# Patient Record
Sex: FEMALE | URBAN - METROPOLITAN AREA
[De-identification: names, ages, dates, MRNs, and addresses within clinical notes are randomized per-mention and may not be internally consistent; named-entity substitution may affect disease eponyms.]

---

## 2023-08-02 ENCOUNTER — OFFICE VISIT (OUTPATIENT)
Dept: PRIMARY CARE CLINIC | Age: 33
End: 2023-08-02
Payer: COMMERCIAL

## 2023-08-02 VITALS
BODY MASS INDEX: 36.84 KG/M2 | DIASTOLIC BLOOD PRESSURE: 82 MMHG | TEMPERATURE: 97.9 F | HEIGHT: 64 IN | SYSTOLIC BLOOD PRESSURE: 120 MMHG | WEIGHT: 215.8 LBS

## 2023-08-02 DIAGNOSIS — Z31.69 PRE-CONCEPTION COUNSELING: ICD-10-CM

## 2023-08-02 DIAGNOSIS — R53.83 OTHER FATIGUE: Primary | ICD-10-CM

## 2023-08-02 DIAGNOSIS — Z86.2 HX OF IRON DEFICIENCY ANEMIA: ICD-10-CM

## 2023-08-02 DIAGNOSIS — R74.8 ELEVATED ALKALINE PHOSPHATASE LEVEL: ICD-10-CM

## 2023-08-02 PROCEDURE — 99204 OFFICE O/P NEW MOD 45 MIN: CPT | Performed by: FAMILY MEDICINE

## 2023-08-02 SDOH — ECONOMIC STABILITY: FOOD INSECURITY: WITHIN THE PAST 12 MONTHS, YOU WORRIED THAT YOUR FOOD WOULD RUN OUT BEFORE YOU GOT MONEY TO BUY MORE.: NEVER TRUE

## 2023-08-02 SDOH — ECONOMIC STABILITY: FOOD INSECURITY: WITHIN THE PAST 12 MONTHS, THE FOOD YOU BOUGHT JUST DIDN'T LAST AND YOU DIDN'T HAVE MONEY TO GET MORE.: NEVER TRUE

## 2023-08-02 SDOH — ECONOMIC STABILITY: INCOME INSECURITY: HOW HARD IS IT FOR YOU TO PAY FOR THE VERY BASICS LIKE FOOD, HOUSING, MEDICAL CARE, AND HEATING?: PATIENT DECLINED

## 2023-08-02 SDOH — ECONOMIC STABILITY: HOUSING INSECURITY
IN THE LAST 12 MONTHS, WAS THERE A TIME WHEN YOU DID NOT HAVE A STEADY PLACE TO SLEEP OR SLEPT IN A SHELTER (INCLUDING NOW)?: NO

## 2023-08-02 ASSESSMENT — PATIENT HEALTH QUESTIONNAIRE - PHQ9
SUM OF ALL RESPONSES TO PHQ QUESTIONS 1-9: 0
2. FEELING DOWN, DEPRESSED OR HOPELESS: 0
1. LITTLE INTEREST OR PLEASURE IN DOING THINGS: 0
SUM OF ALL RESPONSES TO PHQ9 QUESTIONS 1 & 2: 0
SUM OF ALL RESPONSES TO PHQ QUESTIONS 1-9: 0

## 2023-08-02 ASSESSMENT — ENCOUNTER SYMPTOMS
NAUSEA: 0
COUGH: 0
VOMITING: 0
SHORTNESS OF BREATH: 0
CONSTIPATION: 0
DIARRHEA: 0

## 2023-08-18 DIAGNOSIS — Z86.2 HX OF IRON DEFICIENCY ANEMIA: ICD-10-CM

## 2023-08-18 DIAGNOSIS — R74.8 ELEVATED ALKALINE PHOSPHATASE LEVEL: ICD-10-CM

## 2023-08-18 DIAGNOSIS — R53.83 OTHER FATIGUE: ICD-10-CM

## 2023-08-18 LAB
25(OH)D3 SERPL-MCNC: 35.5 NG/ML
ALBUMIN SERPL-MCNC: 4.4 G/DL (ref 3.4–5)
ALBUMIN/GLOB SERPL: 1.9 {RATIO} (ref 1.1–2.2)
ALP SERPL-CCNC: 83 U/L (ref 40–129)
ALT SERPL-CCNC: 19 U/L (ref 10–40)
ANION GAP SERPL CALCULATED.3IONS-SCNC: 9 MMOL/L (ref 3–16)
AST SERPL-CCNC: 16 U/L (ref 15–37)
BASOPHILS # BLD: 0 K/UL (ref 0–0.2)
BASOPHILS NFR BLD: 0.7 %
BILIRUB DIRECT SERPL-MCNC: <0.2 MG/DL (ref 0–0.3)
BILIRUB INDIRECT SERPL-MCNC: NORMAL MG/DL (ref 0–1)
BILIRUB SERPL-MCNC: <0.2 MG/DL (ref 0–1)
BUN SERPL-MCNC: 12 MG/DL (ref 7–20)
CALCIUM SERPL-MCNC: 9.3 MG/DL (ref 8.3–10.6)
CHLORIDE SERPL-SCNC: 103 MMOL/L (ref 99–110)
CHOLEST SERPL-MCNC: 206 MG/DL (ref 0–199)
CO2 SERPL-SCNC: 29 MMOL/L (ref 21–32)
CREAT SERPL-MCNC: 0.8 MG/DL (ref 0.6–1.1)
DEPRECATED RDW RBC AUTO: 12.9 % (ref 12.4–15.4)
EOSINOPHIL # BLD: 0.1 K/UL (ref 0–0.6)
EOSINOPHIL NFR BLD: 1 %
FOLATE SERPL-MCNC: 15.54 NG/ML (ref 4.78–24.2)
GFR SERPLBLD CREATININE-BSD FMLA CKD-EPI: >60 ML/MIN/{1.73_M2}
GGT SERPL-CCNC: 16 U/L (ref 5–36)
GLUCOSE SERPL-MCNC: 107 MG/DL (ref 70–99)
HCT VFR BLD AUTO: 39.1 % (ref 36–48)
HDLC SERPL-MCNC: 42 MG/DL (ref 40–60)
HGB BLD-MCNC: 13.4 G/DL (ref 12–16)
LDLC SERPL CALC-MCNC: 135 MG/DL
LYMPHOCYTES # BLD: 2.2 K/UL (ref 1–5.1)
LYMPHOCYTES NFR BLD: 43.3 %
MCH RBC QN AUTO: 29.9 PG (ref 26–34)
MCHC RBC AUTO-ENTMCNC: 34.3 G/DL (ref 31–36)
MCV RBC AUTO: 87.2 FL (ref 80–100)
MONOCYTES # BLD: 0.4 K/UL (ref 0–1.3)
MONOCYTES NFR BLD: 7.5 %
NEUTROPHILS # BLD: 2.4 K/UL (ref 1.7–7.7)
NEUTROPHILS NFR BLD: 47.5 %
PLATELET # BLD AUTO: 261 K/UL (ref 135–450)
PMV BLD AUTO: 9 FL (ref 5–10.5)
POTASSIUM SERPL-SCNC: 4.3 MMOL/L (ref 3.5–5.1)
PROT SERPL-MCNC: 6.7 G/DL (ref 6.4–8.2)
RBC # BLD AUTO: 4.48 M/UL (ref 4–5.2)
SODIUM SERPL-SCNC: 141 MMOL/L (ref 136–145)
TRIGL SERPL-MCNC: 144 MG/DL (ref 0–150)
TSH SERPL DL<=0.005 MIU/L-ACNC: 0.92 UIU/ML (ref 0.27–4.2)
VIT B12 SERPL-MCNC: 777 PG/ML (ref 211–911)
VLDLC SERPL CALC-MCNC: 29 MG/DL
WBC # BLD AUTO: 5.1 K/UL (ref 4–11)

## 2023-08-19 LAB
EST. AVERAGE GLUCOSE BLD GHB EST-MCNC: 93.9 MG/DL
HBA1C MFR BLD: 4.9 %

## 2023-09-08 ENCOUNTER — OFFICE VISIT (OUTPATIENT)
Dept: PRIMARY CARE CLINIC | Age: 33
End: 2023-09-08
Payer: COMMERCIAL

## 2023-09-08 DIAGNOSIS — J35.3 ENLARGED TONSILS AND ADENOIDS: Chronic | ICD-10-CM

## 2023-09-08 DIAGNOSIS — J02.0 STREP PHARYNGITIS: Primary | ICD-10-CM

## 2023-09-08 DIAGNOSIS — R06.83 LOUD SNORING: Chronic | ICD-10-CM

## 2023-09-08 PROCEDURE — 99214 OFFICE O/P EST MOD 30 MIN: CPT | Performed by: FAMILY MEDICINE

## 2023-09-08 RX ORDER — AMOXICILLIN 500 MG/1
500 CAPSULE ORAL 2 TIMES DAILY
Qty: 20 CAPSULE | Refills: 0 | Status: SHIPPED | OUTPATIENT
Start: 2023-09-08 | End: 2023-09-18

## 2023-09-08 ASSESSMENT — ENCOUNTER SYMPTOMS
VOMITING: 0
CONSTIPATION: 0
NAUSEA: 0
DIARRHEA: 0
SHORTNESS OF BREATH: 0
COUGH: 0

## 2023-09-08 NOTE — PROGRESS NOTES
Libby Galvan (:  1990) is a 34 y.o. female,Established patient, here for evaluation of the following chief complaint(s):  Pharyngitis      SUBJECTIVE:  9.8.23 -- symptoms started yesterday with sore throat on left side and swollen tonsil on left side. Did have chills  Had tonsillitis and strep in high school many years ago. No other sick contacts that she knows about.   + strep swab in office. Recently had COVID in 2023  Prev in [place], recently moved this weekend  Child is in     Not on any medications currently   Family hx of DM in her maternal aunt. Does notice that when she gets very hungry, she can get very weak and fatigued and it is happening more often. Has been occurring overall for a few years but worsening recently. Is wanting to try to conceive again in the next 6 months, will need referral to OB GYN   -- did have gestational hypertension  - had to be induced at 37 weeks due to lack of growth but otherwise pregnancy was fine. Unsure of cause of the arrest of growth    Pharyngitis  Pertinent negatives include no chills, coughing, fever, nausea, rash or vomiting. I have reviewed the chart notes available from myself and other providers. I have reviewed and addressed all active problems and created or updated the problems list in detail, as needed    I have extensively reviewed and reconciled the medication list, discontinued medications not taking or no longer appropriate, and updated the active meds list    OBJECTIVE:  Review of Systems   Constitutional:  Negative for chills and fever. Respiratory:  Negative for cough and shortness of breath. Cardiovascular:  Negative for leg swelling. Gastrointestinal:  Negative for constipation, diarrhea, nausea and vomiting. Endocrine: Negative for polyuria. Genitourinary:  Negative for frequency. Skin:  Negative for rash.        Vitals:    23 1245 23 1303   BP: (!) 142/88 132/80

## 2023-09-10 ENCOUNTER — PATIENT MESSAGE (OUTPATIENT)
Dept: PRIMARY CARE CLINIC | Age: 33
End: 2023-09-10

## 2023-09-11 VITALS
SYSTOLIC BLOOD PRESSURE: 132 MMHG | BODY MASS INDEX: 36.32 KG/M2 | TEMPERATURE: 98.1 F | HEART RATE: 117 BPM | DIASTOLIC BLOOD PRESSURE: 80 MMHG | WEIGHT: 211.6 LBS

## 2023-09-11 PROBLEM — J35.3 ENLARGED TONSILS AND ADENOIDS: Chronic | Status: ACTIVE | Noted: 2023-09-11

## 2023-09-11 PROBLEM — R06.83 LOUD SNORING: Chronic | Status: ACTIVE | Noted: 2023-09-11

## 2023-09-18 ENCOUNTER — OFFICE VISIT (OUTPATIENT)
Dept: ENT CLINIC | Age: 33
End: 2023-09-18
Payer: COMMERCIAL

## 2023-09-18 VITALS
HEART RATE: 134 BPM | BODY MASS INDEX: 35.85 KG/M2 | HEIGHT: 64 IN | WEIGHT: 210 LBS | OXYGEN SATURATION: 94 % | DIASTOLIC BLOOD PRESSURE: 91 MMHG | SYSTOLIC BLOOD PRESSURE: 142 MMHG

## 2023-09-18 DIAGNOSIS — G47.30 SLEEP DISORDER BREATHING: Primary | ICD-10-CM

## 2023-09-18 DIAGNOSIS — J35.1 TONSILLAR HYPERTROPHY: ICD-10-CM

## 2023-09-18 PROCEDURE — 99203 OFFICE O/P NEW LOW 30 MIN: CPT | Performed by: OTOLARYNGOLOGY

## 2023-09-18 NOTE — PROGRESS NOTES
adenotonsillectomy. If she does have sleep apnea certainly an adenotonsillectomy would likely improve her sleep apnea, but not cure her. The first-line treatment would be a CPAP machine. My recommendation would be to obtain a sleep study. Patient agrees. - Oriana Tracy MD, Sleep Medicine, [facility]    2. Tonsillar hypertrophy  Size alone usually is not an indication for tonsillectomy. She is only had 1 episode of strep throat so she would not meet criteria for tonsillectomy based on this. If she starts getting more frequent strep throat we could consider taking out her tonsils and adenoids. I have performed a head and neck physical exam personally or was physically present during the key or critical portions of the service. This note was generated completely or in part utilizing Dragon dictation speech recognition software. Occasionally, words are mistranscribed and despite editing, the text may contain inaccuracies due to incorrect word recognition. If further clarification is needed please contact the office at [phone].

## 2023-10-02 ENCOUNTER — OFFICE VISIT (OUTPATIENT)
Dept: PRIMARY CARE CLINIC | Age: 33
End: 2023-10-02
Payer: COMMERCIAL

## 2023-10-02 VITALS
WEIGHT: 209.8 LBS | HEART RATE: 81 BPM | TEMPERATURE: 97.3 F | DIASTOLIC BLOOD PRESSURE: 89 MMHG | SYSTOLIC BLOOD PRESSURE: 123 MMHG | OXYGEN SATURATION: 98 % | BODY MASS INDEX: 36.01 KG/M2

## 2023-10-02 DIAGNOSIS — R06.83 LOUD SNORING: Chronic | ICD-10-CM

## 2023-10-02 DIAGNOSIS — S39.92XA TAILBONE INJURY, INITIAL ENCOUNTER: Primary | Chronic | ICD-10-CM

## 2023-10-02 PROCEDURE — 99213 OFFICE O/P EST LOW 20 MIN: CPT | Performed by: FAMILY MEDICINE

## 2023-10-02 ASSESSMENT — ENCOUNTER SYMPTOMS
SHORTNESS OF BREATH: 0
CONSTIPATION: 0
VOMITING: 0
NAUSEA: 0
DIARRHEA: 0
COUGH: 0

## 2023-10-02 NOTE — PROGRESS NOTES
Specialty Services Required     Requested Specialty:   Physical Therapist     Number of Visits Requested:   1       No follow-ups on file. Dr. Francie Diaz and [facility]        Usual doctor's hours are:       Monday 7:00 am to 5:30 pm  Wednesday 7:00 am to 4:30 pm  Thursday 7:00 am to 4:30 pm  Friday 7:00 am to 3:30 pm  Saturdays, Sundays, and after hours: E-Visits are available    We observe most federal holidays and Good Friday. We ask that you only contact the office one time per issue or question, and please allow one full business day for a call back. Calling us back multiple times keeps us from being able to complete the work efficiently for you and our other patients. For medication renewals, please call your pharmacist to contact us, and be sure to allow at least 3 business days for processing before you need to  your medication. If you are sick or need an appointment that hasn't been planned, same day appointments are available every day the office is open: Monday, Tuesday, Wednesday, Thursday, and Friday. Call during office hours to schedule, even if it may not be with your regular physician. You may also call the office after 8 am on office days if you need to be seen from an issue the night before. During hours when the office is not normally open, your call will go to the messaging service which cannot provide any service other than paging the doctor. No prescriptions or other nonurgent matters will be handled and no voicemail is available, so please call back during office hours for these matters.        Electronically signed by Cathy Melendez DO on 10/2/2023 at 2:37 PM.

## 2023-10-04 NOTE — PLAN OF CARE
complaint: Pt is a 35 yo female c/o of pain at her tailbone after sustaining a fall down her steps. Pt slipped down the stairs about 1 month ago. She received chiropractor care and it did not help. She mostly has pain with sitting. She has a sharp pain on the L side more than the R. Sitting for prolonged periods of time makes it more painful. The pressure on hes Sacrum/coccyx is the biggest problem. She can sit for 20 minutes before she needs to shift.      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    Sport/recreational activities: Running, crafting    OBJECTIVE EXAMINATION     ROM/Strength: (Blank cells denote NT)       AROM L AROM R Notes PROM L PROM R Notes               LUMBAR Flexion WNL        Extension WNL        Sidebend WNL WNL        Rotation                       HIP Flexion          Abduction          ER          IR          Extension                   KNEE Flexion          Extension                     ANKLE Dorsiflexion          Plantarflexion          Inversion          Eversion             MMT L                MMT  R Notes     LUMBAR Flexion      Extension      Lateral flexion       Rotation          MMT L MMT R Notes       HIP Flexion 4/5 4/5     Abduction 4/5 4/5     ER       IR       Extension 4/5** 4/5           KNEE Flexion       Extension               ANKLE DF       PF       Inversion       Eversion        Repeated Movements: [] Normal  [] Abnormal [] N/A    Palpation:   Patient reported tenderness with

## 2023-10-09 ENCOUNTER — HOSPITAL ENCOUNTER (OUTPATIENT)
Dept: PHYSICAL THERAPY | Age: 33
Setting detail: THERAPIES SERIES
Discharge: HOME | End: 2023-10-09
Attending: FAMILY MEDICINE
Payer: COMMERCIAL

## 2023-10-09 DIAGNOSIS — M53.3 COCCYX PAIN: Primary | ICD-10-CM

## 2023-10-09 DIAGNOSIS — R29.898 DECREASED STRENGTH OF LOWER EXTREMITY: ICD-10-CM

## 2023-10-09 PROCEDURE — 97110 THERAPEUTIC EXERCISES: CPT

## 2023-10-09 PROCEDURE — 97161 PT EVAL LOW COMPLEX 20 MIN: CPT

## 2023-10-09 NOTE — FLOWSHEET NOTE
[address] [address]  Phone: [phone]   Fax:     [phone]    Physical Therapy  Cancellation/No-show Note  Patient Name:  Merritt Paulino  :  1990   Date:  10/09/2023  Cancelled visits to date: 01  No-shows to date: 0    Patient status for today's appointment patient:  [x]  Cancelled  []  Rescheduled appointment  []  No-show     Reason given by patient:  [x]  Patient ill  []  Conflicting appointment  []  No transportation    []  Conflict with work  []  No reason given  []  Other:     Comments:      Phone call information:   []  Phone call made today to patient at _ time at number provided:      []  Patient answered, conversation as follows:    []  Patient did not answer, message left as follows:  [x]  Phone call not made today    Electronically signed by:  Rosibel Driver, PT, DPT
physician  [] Other:     CHARGE CAPTURE     CHARGE GRID   CPT Code (TIMED) minutes # CPT Code (UNTIMED) #     [] Therex (11295)  15 1  [] EVAL:LOW (47776 - Typically 20 minutes face-to-face) 1    [] Neuromusc. Re-ed (46512)    [] Re-Eval (02510)     [] Manual ([id number])    [] Estim Unattended (23781)     [] Ther. Act (67730)    [] [place]ps. Traction (14350)     [] Gait (69546)    [] Dry Needle 1-2 muscle (45545)     [] Aquatic Therex (47390)    [] Dry Needle 3+ muscle (58090)     [] Iontophoresis (58780)    [] VASO (58010)     [] Ultrasound (71674)    [] Group Therapy (27369)     [] Estim Attended (19815)    [] Other: Total Timed Code Tx Minutes 15        Total Treatment Minutes 35        Charge Justification:  (07091) THERAPEUTIC EXERCISE - Provided verbal/tactile cueing for activities related to strengthening, flexibility, endurance, ROM performed to prevent loss of range of motion, maintain or improve muscular strength or increase flexibility, following either an injury or surgery. (92541) [address] [facility]- Reviewed/Progressed HEP activities related to strengthening, flexibility, endurance, ROM performed to prevent loss of range of motion, maintain or improve muscular strength or increase flexibility, following either an injury or surgery. (93728) NEUROMUSCULAR RE-EDUCATION- Therapeutic procedure, 1 or more areas, each 15 minutes; neuromuscular reeducation of movement, balance, coordination, kinesthetic sense, posture, and/or proprioception for sitting and/or standing activities  (97317) [address] [facility]- Reviewed/Progressed HEP activities related to neuromuscular reeducation of movement, balance, coordination, kinesthetic sense, posture, and/or proprioception for sitting and/or standing activities    (11444) THERAPEUTIC ACTIVITY- use of dynamic activities to improve functional performance.  (Ex include squatting, ascending/descending stairs, walking, bending, lifting, catching, throwing, pushing,

## 2023-10-13 ENCOUNTER — HOSPITAL ENCOUNTER (OUTPATIENT)
Dept: PHYSICAL THERAPY | Age: 33
Setting detail: THERAPIES SERIES
Discharge: HOME | End: 2023-10-13
Attending: FAMILY MEDICINE
Payer: COMMERCIAL

## 2023-10-13 PROCEDURE — 97161 PT EVAL LOW COMPLEX 20 MIN: CPT

## 2023-10-13 PROCEDURE — 97110 THERAPEUTIC EXERCISES: CPT

## 2023-10-16 ENCOUNTER — OFFICE VISIT (OUTPATIENT)
Dept: SLEEP MEDICINE | Age: 33
End: 2023-10-16
Payer: COMMERCIAL

## 2023-10-16 VITALS
OXYGEN SATURATION: 98 % | RESPIRATION RATE: 18 BRPM | HEART RATE: 102 BPM | TEMPERATURE: 96.9 F | WEIGHT: 207.4 LBS | DIASTOLIC BLOOD PRESSURE: 90 MMHG | BODY MASS INDEX: 35.41 KG/M2 | SYSTOLIC BLOOD PRESSURE: 114 MMHG | HEIGHT: 64 IN

## 2023-10-16 DIAGNOSIS — R06.83 SNORING: ICD-10-CM

## 2023-10-16 DIAGNOSIS — G47.33 OSA (OBSTRUCTIVE SLEEP APNEA): Primary | ICD-10-CM

## 2023-10-16 DIAGNOSIS — R06.83 LOUD SNORING: Chronic | ICD-10-CM

## 2023-10-16 DIAGNOSIS — J35.3 ENLARGED TONSILS AND ADENOIDS: Chronic | ICD-10-CM

## 2023-10-16 DIAGNOSIS — R06.81 WITNESSED EPISODE OF APNEA: ICD-10-CM

## 2023-10-16 DIAGNOSIS — E66.09 CLASS 2 OBESITY DUE TO EXCESS CALORIES WITHOUT SERIOUS COMORBIDITY WITH BODY MASS INDEX (BMI) OF 35.0 TO 35.9 IN ADULT: ICD-10-CM

## 2023-10-16 PROCEDURE — 99204 OFFICE O/P NEW MOD 45 MIN: CPT | Performed by: PSYCHIATRY & NEUROLOGY

## 2023-10-16 ASSESSMENT — ENCOUNTER SYMPTOMS
GASTROINTESTINAL NEGATIVE: 1
CHOKING: 1
SORE THROAT: 1
ALLERGIC/IMMUNOLOGIC NEGATIVE: 1
APNEA: 1
EYES NEGATIVE: 1

## 2023-10-16 ASSESSMENT — SLEEP AND FATIGUE QUESTIONNAIRES
NECK CIRCUMFERENCE (INCHES): 13
ESS TOTAL SCORE: 3
HOW LIKELY ARE YOU TO NOD OFF OR FALL ASLEEP WHILE SITTING AND READING: 0
HOW LIKELY ARE YOU TO NOD OFF OR FALL ASLEEP WHILE SITTING INACTIVE IN A PUBLIC PLACE: 0
HOW LIKELY ARE YOU TO NOD OFF OR FALL ASLEEP WHEN YOU ARE A PASSENGER IN A CAR FOR AN HOUR WITHOUT A BREAK: 2
HOW LIKELY ARE YOU TO NOD OFF OR FALL ASLEEP WHILE LYING DOWN TO REST IN THE AFTERNOON WHEN CIRCUMSTANCES PERMIT: 1
HOW LIKELY ARE YOU TO NOD OFF OR FALL ASLEEP IN A CAR, WHILE STOPPED FOR A FEW MINUTES IN TRAFFIC: 0
HOW LIKELY ARE YOU TO NOD OFF OR FALL ASLEEP WHILE SITTING QUIETLY AFTER LUNCH WITHOUT ALCOHOL: 0
HOW LIKELY ARE YOU TO NOD OFF OR FALL ASLEEP WHILE WATCHING TV: 0
HOW LIKELY ARE YOU TO NOD OFF OR FALL ASLEEP WHILE SITTING AND TALKING TO SOMEONE: 0

## 2023-10-16 NOTE — PATIENT INSTRUCTIONS
Orders Placed This Encounter   Procedures    Home Sleep Study     Standing Status:   Future     Standing Expiration Date:   10/16/2024     Order Specific Question:   Location For Sleep Study     Answer:   [facility]     Order Specific Question:   Select Sleep Lab Location     Answer:   [facility]

## 2023-10-23 ENCOUNTER — HOSPITAL ENCOUNTER (OUTPATIENT)
Dept: SLEEP CENTER | Age: 33
Discharge: HOME | End: 2023-10-23
Attending: PSYCHIATRY & NEUROLOGY
Payer: COMMERCIAL

## 2023-10-23 DIAGNOSIS — G47.33 OSA (OBSTRUCTIVE SLEEP APNEA): ICD-10-CM

## 2023-10-23 PROCEDURE — 95806 SLEEP STUDY UNATT&RESP EFFT: CPT

## 2023-10-25 PROBLEM — G47.33 OSA (OBSTRUCTIVE SLEEP APNEA): Status: ACTIVE | Noted: 2023-10-25

## 2023-10-26 ENCOUNTER — TELEPHONE (OUTPATIENT)
Dept: SLEEP MEDICINE | Age: 33
End: 2023-10-26

## 2023-10-26 NOTE — TELEPHONE ENCOUNTER
Called an spoke with pt to see what dme company she wants to use. Will send a copy to her through my chart.

## 2023-10-26 NOTE — TELEPHONE ENCOUNTER
Sleep study showed moderate DINAH. AHI was 22.1  per hr. And O2 Desaturations to 83%.   Dr Tyson Triana APAP between 5 and 15 cm      Pt l/m wcb

## 2023-10-30 ENCOUNTER — APPOINTMENT (OUTPATIENT)
Dept: PHYSICAL THERAPY | Age: 33
End: 2023-10-30
Attending: FAMILY MEDICINE
Payer: COMMERCIAL

## 2024-09-30 ENCOUNTER — TELEPHONE (OUTPATIENT)
Dept: PULMONOLOGY | Age: 34
End: 2024-09-30

## 2024-09-30 NOTE — TELEPHONE ENCOUNTER
Patient is asking for an airsense mini travel pap. She uses [facility] and they will need a script.  Please call

## 2024-10-01 NOTE — PROGRESS NOTES
Freya Sarath         : 1990  [] [facility]     [] [facility]      [x] [facility]     [facility]    [] [facility]  [] [facility]  [] [facility]   [] [facility] [] Other:  Diagnosis: [x] DINAH (G47.33) [] CSA (G47.31) [] Apnea (G47.30)   Length of Need: [] 12 Months [x] 99 Months [] Other:    Machine (AMADO!):  [x] airsense mini travel  [] Other:     [x]  CPAP () [] Bilevel ()   Mode: [x] Auto [] Spontaneous    Mode: [] Auto [] Spontaneous           Between 5 and 15 cm                 Comfort Settings:     If the order for CPAP  - Ramp Pressure: 5 cmH2O                                        - Ramp time: 15 min                                     -  Flex/EPR - 3 full time       Humidifier: [x] Heated ()        [x] Water chamber replacement ()/ 1 per 6 months        Mask:  Please always start with the mask the patient used during the titraion   [x] Nasal () /1 per 3 months [x] Full Face () /1 per 3 months   [x] Patient choice -Size and fit mask [x] Patient Choice - Size and fit mask   [] Dispense:  [] Dispense:    [x] Headgear () / 1 per 6 months [x] Headgear () / 1 per 3 months   [x] Replacement Nasal Cushion ()/2 per month [x] Interface Replacement ()/1 per month   [x] Replacement Nasal Pillows ()/2 per month         Tubing: [x] Heated ()/1 per 3 months    [] Standard ()/1 per 3 months [] Other:           Filters: [x] Non-disposable ()/1 per 6 months     [x] Ultra-Fine, Disposable ()/2 per month        Miscellaneous: [x] Chin Strap ()/ 1 per 6 months [] O2 bleed-in:       LPM   [] Oximetry on CPAP/Bilevel []  Other:          Start Order Date: 10/01/24    MEDICAL JUSTIFICATION:  I, the undersigned, certify that the above prescribed supplies are medically necessary for this patient’s wellbeing.  In my opinion, the supplies are both reasonable and necessary in reference to accepted standards of medicalpractice in

## 2025-06-19 LAB
Lab: ABNORMAL
PATIENT ALLERGIC: NO
SPECIMEN DESCRIPTION: ABNORMAL
STREP B DNA AMP: DETECTED